# Patient Record
Sex: MALE | Race: WHITE
[De-identification: names, ages, dates, MRNs, and addresses within clinical notes are randomized per-mention and may not be internally consistent; named-entity substitution may affect disease eponyms.]

---

## 2018-12-03 ENCOUNTER — HOSPITAL ENCOUNTER (OUTPATIENT)
Age: 63
Discharge: HOME | End: 2018-12-03

## 2018-12-03 ENCOUNTER — HOSPITAL ENCOUNTER (OUTPATIENT)
Dept: HOSPITAL 91 - GIL | Age: 63
Discharge: HOME | End: 2018-12-03
Payer: COMMERCIAL

## 2018-12-03 DIAGNOSIS — K57.30: ICD-10-CM

## 2018-12-03 DIAGNOSIS — Z12.11: Primary | ICD-10-CM

## 2018-12-03 PROCEDURE — 45378 DIAGNOSTIC COLONOSCOPY: CPT

## 2024-02-27 ENCOUNTER — OFFICE (OUTPATIENT)
Dept: URBAN - METROPOLITAN AREA CLINIC 51 | Facility: CLINIC | Age: 69
End: 2024-02-27

## 2024-02-27 DIAGNOSIS — R14.3 FLATULENCE: ICD-10-CM

## 2024-02-27 DIAGNOSIS — Z86.010 PERSONAL HISTORY COLON POLYPS: ICD-10-CM

## 2024-02-27 PROCEDURE — 99204 OFFICE O/P NEW MOD 45 MIN: CPT | Performed by: SPECIALIST

## 2024-02-27 NOTE — SERVICEHPINOTES
Fredrick Avalos is a 68-year-old male patient presenting today for an evaluation for a colon cancer screening. He has a hx of colon polyps. He states that he has increased flatulence. The patient has no family history of colon cancer or other significant GI diseases. He states that he had aortic aneurysm 6 months ago. He is following-up with his cardiologist. He states that he is on aspirin 81 mg. The patient denies fever, nausea, vomiting, dysphagia, reflux, abdominal pain, constipation, diarrhea, rectal bleeding, melena, and significant weight change. Denies shortness of breath and chest pain.

## 2024-02-27 NOTE — SERVICENOTES
The patient and family/friends if present were apprised of the use of a virtual scribe through remote viewing and/or audio and all parties consented to conduct the visit in this manner. Documentation assistance was provided by Malvin Ellison.